# Patient Record
Sex: MALE | Race: BLACK OR AFRICAN AMERICAN | NOT HISPANIC OR LATINO | Employment: FULL TIME | URBAN - METROPOLITAN AREA
[De-identification: names, ages, dates, MRNs, and addresses within clinical notes are randomized per-mention and may not be internally consistent; named-entity substitution may affect disease eponyms.]

---

## 2021-03-26 ENCOUNTER — IMMUNIZATION (OUTPATIENT)
Dept: PRIMARY CARE CLINIC | Facility: CLINIC | Age: 41
End: 2021-03-26
Payer: COMMERCIAL

## 2021-03-26 DIAGNOSIS — Z23 NEED FOR VACCINATION: Primary | ICD-10-CM

## 2021-03-26 PROCEDURE — 0001A PR IMMUNIZ ADMIN, SARS-COV-2 COVID-19 VACC, 30MCG/0.3ML, 1ST DOSE: ICD-10-PCS | Mod: CV19,S$GLB,, | Performed by: INTERNAL MEDICINE

## 2021-03-26 PROCEDURE — 91300 PR SARS-COV- 2 COVID-19 VACCINE, NO PRSV, 30MCG/0.3ML, IM: CPT | Mod: S$GLB,,, | Performed by: INTERNAL MEDICINE

## 2021-03-26 PROCEDURE — 0001A PR IMMUNIZ ADMIN, SARS-COV-2 COVID-19 VACC, 30MCG/0.3ML, 1ST DOSE: CPT | Mod: CV19,S$GLB,, | Performed by: INTERNAL MEDICINE

## 2021-03-26 PROCEDURE — 91300 PR SARS-COV- 2 COVID-19 VACCINE, NO PRSV, 30MCG/0.3ML, IM: ICD-10-PCS | Mod: S$GLB,,, | Performed by: INTERNAL MEDICINE

## 2021-03-26 RX ADMIN — Medication 0.3 ML: at 05:03

## 2021-04-17 ENCOUNTER — IMMUNIZATION (OUTPATIENT)
Dept: PRIMARY CARE CLINIC | Facility: CLINIC | Age: 41
End: 2021-04-17
Payer: COMMERCIAL

## 2021-04-17 DIAGNOSIS — Z23 NEED FOR VACCINATION: Primary | ICD-10-CM

## 2021-04-17 PROCEDURE — 0002A PR IMMUNIZ ADMIN, SARS-COV-2 COVID-19 VACC, 30MCG/0.3ML, 2ND DOSE: CPT | Mod: CV19,S$GLB,, | Performed by: INTERNAL MEDICINE

## 2021-04-17 PROCEDURE — 0002A PR IMMUNIZ ADMIN, SARS-COV-2 COVID-19 VACC, 30MCG/0.3ML, 2ND DOSE: ICD-10-PCS | Mod: CV19,S$GLB,, | Performed by: INTERNAL MEDICINE

## 2021-04-17 PROCEDURE — 91300 PR SARS-COV- 2 COVID-19 VACCINE, NO PRSV, 30MCG/0.3ML, IM: CPT | Mod: S$GLB,,, | Performed by: INTERNAL MEDICINE

## 2021-04-17 PROCEDURE — 91300 PR SARS-COV- 2 COVID-19 VACCINE, NO PRSV, 30MCG/0.3ML, IM: ICD-10-PCS | Mod: S$GLB,,, | Performed by: INTERNAL MEDICINE

## 2021-04-17 RX ADMIN — Medication 0.3 ML: at 05:04

## 2023-06-19 LAB — CRC RECOMMENDATION EXT: NORMAL

## 2024-05-11 ENCOUNTER — HOSPITAL ENCOUNTER (EMERGENCY)
Facility: HOSPITAL | Age: 44
Discharge: HOME | End: 2024-05-12
Attending: STUDENT IN AN ORGANIZED HEALTH CARE EDUCATION/TRAINING PROGRAM

## 2024-05-11 DIAGNOSIS — S09.90XA INJURY OF HEAD, INITIAL ENCOUNTER: Primary | ICD-10-CM

## 2024-05-11 DIAGNOSIS — S06.0X0A CONCUSSION WITHOUT LOSS OF CONSCIOUSNESS, INITIAL ENCOUNTER: ICD-10-CM

## 2024-05-11 PROCEDURE — 99284 EMERGENCY DEPT VISIT MOD MDM: CPT | Mod: 25

## 2024-05-11 PROCEDURE — 25000003 PHARM REV CODE 250: Performed by: STUDENT IN AN ORGANIZED HEALTH CARE EDUCATION/TRAINING PROGRAM

## 2024-05-11 RX ORDER — MECLIZINE HCL 12.5 MG 12.5 MG/1
25 TABLET ORAL
Status: COMPLETED | OUTPATIENT
Start: 2024-05-11 | End: 2024-05-11

## 2024-05-11 RX ORDER — ACETAMINOPHEN 500 MG
1000 TABLET ORAL
Status: COMPLETED | OUTPATIENT
Start: 2024-05-11 | End: 2024-05-11

## 2024-05-11 RX ADMIN — ACETAMINOPHEN 1000 MG: 500 TABLET ORAL at 11:05

## 2024-05-11 RX ADMIN — MECLIZINE 25 MG: 12.5 TABLET ORAL at 11:05

## 2024-05-11 NOTE — Clinical Note
"Barrington Jacome" Wanda was seen and treated in our emergency department on 5/11/2024.  He may return to work on 05/14/2024.       If you have any questions or concerns, please don't hesitate to call.      Olivia Lozano MD"

## 2024-05-12 VITALS
DIASTOLIC BLOOD PRESSURE: 78 MMHG | TEMPERATURE: 98 F | OXYGEN SATURATION: 99 % | HEART RATE: 74 BPM | BODY MASS INDEX: 34.41 KG/M2 | WEIGHT: 268 LBS | SYSTOLIC BLOOD PRESSURE: 137 MMHG | RESPIRATION RATE: 18 BRPM

## 2024-05-12 PROCEDURE — 25000003 PHARM REV CODE 250: Performed by: STUDENT IN AN ORGANIZED HEALTH CARE EDUCATION/TRAINING PROGRAM

## 2024-05-12 RX ORDER — MECLIZINE HYDROCHLORIDE 25 MG/1
25 TABLET ORAL 3 TIMES DAILY PRN
Qty: 21 TABLET | Refills: 0 | Status: SHIPPED | OUTPATIENT
Start: 2024-05-12 | End: 2024-05-28

## 2024-05-12 RX ORDER — IBUPROFEN 400 MG/1
800 TABLET ORAL
Status: COMPLETED | OUTPATIENT
Start: 2024-05-12 | End: 2024-05-12

## 2024-05-12 RX ADMIN — IBUPROFEN 800 MG: 400 TABLET ORAL at 01:05

## 2024-05-12 NOTE — ED NOTES
Barrington Muñoz III, a 44 y.o. male presents to the ED w/ complaint of head injury. Patient states he was on a boat and was hit with a metal wire that is about 1 inch in diameter. Patient states having headache to the left side of his head. Initially patient stated his vision was blurry but has since resolved.     Triage note:  Chief Complaint   Patient presents with    Head Injury     Reports getting with a metal wire on a boat, states he felt like his vision was blurry right after the impact but it has resolved     Review of patient's allergies indicates:  No Known Allergies  Past Medical History:   Diagnosis Date    Hypertension     Hypertension

## 2024-05-12 NOTE — DISCHARGE INSTRUCTIONS
You were seen in the emergency department today after head injury.  Your head scan did not show any evidence of intracranial injury such as bleed or broken bones.  Your symptoms are likely due to a mild concussion.  You may take Tylenol, and Motrin as needed for pain.  You also be prescribed a medication to help with your dizziness.  You will need to follow up outpatient with the primary care doctor within 1 week for re-evaluation.    Please return to the emergency department if you experience any new or concerning symptoms including increasing pain, vision changes, weakness, numbness, difficulty walking, lightheadedness, loss of consciousness, or confusion.

## 2024-05-12 NOTE — ED PROVIDER NOTES
Chief Complaint   Head Injury (Reports getting with a metal wire on a boat, states he felt like his vision was blurry right after the impact but it has resolved)      History Of Present Illness   Barrington Muñoz III is a 44 y.o. male with no pertinent PMHx presenting with head injury.  Patient states that he was working on a boat when a inch thick heavy wire struck him in the head on the left side of his forehead.  States that he fell to his knees but did not hit his head on the ground and did not pass out.  States that since then he has had some dizziness that he describes as a room spinning sensation along with a headache particularly to the left side of his head.  He reports no blurry vision, loss of vision, weakness, or numbness.  He reports no chest pain, shortness of breath.  He reports no nausea, or vomiting.  No medications taken prior to arrival.  Does not take any blood thinners.  Reports no known wounds or lacerations.    Independent Historian: Yes  Other Historian or Collateral: Chart review  Interpretor: No      Review of patient's allergies indicates:  No Known Allergies    No current facility-administered medications on file prior to encounter.     Current Outpatient Medications on File Prior to Encounter   Medication Sig Dispense Refill    lisinopril-hydrochlorothiazide (PRINZIDE,ZESTORETIC) 20-12.5 mg per tablet Take 1 tablet by mouth once daily.         Past History   As per HPI and below:  Past Medical History:   Diagnosis Date    Hypertension     Hypertension      History reviewed. No pertinent surgical history.    Social History     Socioeconomic History    Marital status:    Tobacco Use    Smoking status: Never   Substance and Sexual Activity    Alcohol use: No    Drug use: No    Sexual activity: Yes     Partners: Female       No family history on file.    Physical Exam     Vitals:    05/11/24 2210   BP: (!) 141/95   Pulse: 101   Resp: 18   Temp: 98.8 °F (37.1 °C)   TempSrc: Oral   SpO2:  97%   Weight: 121.6 kg (268 lb)       Physical Exam  Constitutional:       General: He is not in acute distress.     Appearance: He is well-developed. He is not toxic-appearing or diaphoretic.   HENT:      Head: Normocephalic and atraumatic.        Comments: Small scalp hematoma to left upper forehead without overlying abrasions/laceration or palpable skull deformity.     Nose: Nose normal. No congestion.      Mouth/Throat:      Mouth: Mucous membranes are moist.      Pharynx: Oropharynx is clear.   Eyes:      Extraocular Movements: Extraocular movements intact.      Right eye: No nystagmus.      Left eye: No nystagmus.      Pupils: Pupils are equal, round, and reactive to light.   Cardiovascular:      Rate and Rhythm: Normal rate and regular rhythm.   Pulmonary:      Effort: No respiratory distress.      Breath sounds: No wheezing or rhonchi.   Abdominal:      General: There is no distension.   Musculoskeletal:         General: No deformity.      Cervical back: No rigidity.      Comments: No midline C-spine tenderness   Skin:     General: Skin is warm and dry.      Capillary Refill: Capillary refill takes less than 2 seconds.   Neurological:      General: No focal deficit present.      Mental Status: He is alert and oriented to person, place, and time.      Cranial Nerves: No cranial nerve deficit.      Sensory: No sensory deficit.      Motor: No weakness.             Results     Labs Reviewed   HIV 1 / 2 ANTIBODY   HEPATITIS C ANTIBODY       Imaging Results    None               Initial [facility]   Medical Decision Making  Patient is a 44-year-old male presenting after head injury.  Given his continued headache and dizziness, Will get CT to further evaluate for intracranial bleed.  No CT C-spine indicated at this time based on exam.  No other evidence of acute traumatic injury on exam.  Will treat patient's symptoms in the meantime and re-evaluate.    Amount and/or Complexity of Data Reviewed  Radiology:  ordered.    Risk  OTC drugs.  Prescription drug management.               Medications Given / Interventions     Medications   acetaminophen tablet 1,000 mg (1,000 mg Oral Given 5/11/24 2322)   meclizine tablet 25 mg (25 mg Oral Given 5/11/24 2322)       Procedures     ED POCUS Performed: No    Reassessment and ED Course      Head CT independently interpreted without evidence of intracranial bleed or pathology.    Discussed results with the patient.  Discussed need for outpatient follow up.  Discussed continued symptomatic management.  DC to home with return precautions.         Final diagnoses:  [S09.90XA] Injury of head, initial encounter (Primary)           Dispo                        Olivia Lozano MD  05/12/24 8423

## 2024-05-14 ENCOUNTER — HOSPITAL ENCOUNTER (EMERGENCY)
Facility: HOSPITAL | Age: 44
Discharge: HOME | End: 2024-05-14
Attending: STUDENT IN AN ORGANIZED HEALTH CARE EDUCATION/TRAINING PROGRAM
Payer: COMMERCIAL

## 2024-05-14 VITALS
SYSTOLIC BLOOD PRESSURE: 179 MMHG | TEMPERATURE: 99 F | OXYGEN SATURATION: 98 % | RESPIRATION RATE: 17 BRPM | DIASTOLIC BLOOD PRESSURE: 90 MMHG | WEIGHT: 268 LBS | HEART RATE: 70 BPM | BODY MASS INDEX: 34.39 KG/M2 | HEIGHT: 74 IN

## 2024-05-14 DIAGNOSIS — R51.9 ACUTE NONINTRACTABLE HEADACHE, UNSPECIFIED HEADACHE TYPE: Primary | ICD-10-CM

## 2024-05-14 DIAGNOSIS — S09.90XA TRAUMATIC INJURY OF HEAD, INITIAL ENCOUNTER: ICD-10-CM

## 2024-05-14 PROCEDURE — 25000003 PHARM REV CODE 250: Performed by: STUDENT IN AN ORGANIZED HEALTH CARE EDUCATION/TRAINING PROGRAM

## 2024-05-14 PROCEDURE — 99284 EMERGENCY DEPT VISIT MOD MDM: CPT | Mod: 25

## 2024-05-14 RX ORDER — KETOROLAC TROMETHAMINE 10 MG/1
10 TABLET, FILM COATED ORAL
Status: COMPLETED | OUTPATIENT
Start: 2024-05-14 | End: 2024-05-14

## 2024-05-14 RX ORDER — BUTALBITAL, ACETAMINOPHEN AND CAFFEINE 50; 325; 40 MG/1; MG/1; MG/1
1 TABLET ORAL EVERY 6 HOURS PRN
Qty: 30 TABLET | Refills: 0 | Status: SHIPPED | OUTPATIENT
Start: 2024-05-14 | End: 2024-05-28 | Stop reason: ALTCHOICE

## 2024-05-14 RX ORDER — ONDANSETRON 4 MG/1
4 TABLET, ORALLY DISINTEGRATING ORAL EVERY 8 HOURS PRN
Qty: 12 TABLET | Refills: 0 | Status: SHIPPED | OUTPATIENT
Start: 2024-05-14 | End: 2024-05-28 | Stop reason: ALTCHOICE

## 2024-05-14 RX ADMIN — KETOROLAC TROMETHAMINE 10 MG: 10 TABLET, FILM COATED ORAL at 10:05

## 2024-05-14 NOTE — DISCHARGE INSTRUCTIONS
Follow up primary care physician or occupational medicine physician for further management evaluation and return to ED for any worsening symptoms

## 2024-05-14 NOTE — ED PROVIDER NOTES
Encounter Date: 5/14/2024       History     Chief Complaint   Patient presents with    Headache     Ongoing since getting hit in head x 3 days ago. Was seen in ED following and dx with concussion but reports no relief with OTC meds     44-year-old male presents with persistent headache.  Ongoing for the last 3 days.  Occurred after blunt trauma while on the job.  Symptoms not alleviated with Tylenol over-the-counter.  No associated laceration.  No anticoagulation use.    The history is provided by the patient.     Review of patient's allergies indicates:  No Known Allergies  Past Medical History:   Diagnosis Date    Hypertension     Hypertension      History reviewed. No pertinent surgical history.  No family history on file.  Social History     Tobacco Use    Smoking status: Never   Substance Use Topics    Alcohol use: No    Drug use: No     Review of Systems   All other systems reviewed and are negative.      Physical Exam     Initial Vitals [05/14/24 0855]   BP Pulse Resp Temp SpO2   (!) 179/90 70 17 98.6 °F (37 °C) 98 %      MAP       --         Physical Exam    Nursing note and vitals reviewed.  Constitutional: Vital signs are normal.  Non-toxic appearance. No distress.   HENT:   Head: Normocephalic.   Left parietal hematoma, no laceration   Eyes: No scleral icterus.   Pulmonary/Chest: No stridor. No respiratory distress.   Bilateral chest rise   Abdominal: There is no guarding.   Musculoskeletal:         General: No tenderness.      Cervical back: No rigidity.     Neurological: He is alert.   GCS 15, No associated focal motor or sensory deficit   Skin: Skin is warm and dry. No rash noted.   No laceration, left parietal hematoma   Psychiatric: His speech is normal. He is not actively hallucinating.   Not anxious  or agitated         ED Course   Procedures  Labs Reviewed - No data to display       Imaging Results              CT Head Without Contrast (Final result)  Result time 05/14/24 09:56:54      Final  result by Mg Valdes MD (05/14/24 09:56:54)                   Impression:      1.  There is no acute intracranial abnormality.  There is no hemorrhage, mass/mass effect, acute edema or ischemia. There is no skull fracture.      Electronically signed by: Mg Valdes MD  Date:    05/14/2024  Time:    09:56               Narrative:    EXAMINATION:  CT HEAD WITHOUT CONTRAST    CLINICAL HISTORY:  Head trauma, moderate-severe;    TECHNIQUE:  Routine unenhanced axial images were obtained through the head.  Sagittal and coronal reformatted images were created.  The study is reviewed in bone and soft tissue windows.    COMPARISON:  Recent head CT dated 05/12/2024    FINDINGS:  Intracranial contents: There is no acute intracranial abnormality or change in the appearance of the brain compared to the recent prior study.  Brain volume, ventricular size and position are normal.  There is no hemorrhage or mass/mass effect.  There is no acute edema or ischemia.  The gray-white interface is preserved without obvious acute infarction.  There are no definite regions of abnormal attenuation in the brain.  There is no dense vessel.  There is no abnormal extra-axial fluid collection.  The basilar cisterns are open.  The cerebellar tonsils are in normal position at the level of the foramen magnum.    Extracranial contents, calvarium, soft tissues: The calvarium is normal.  There is no acute fracture.  Previously demonstrated small left lateral forehead contusion is less conspicuous on the current study.  The included paranasal sinuses and mastoid air cells are clear.                                       Medications   ketorolac tablet 10 mg (has no administration in time range)     Medical Decision Making  44-year-old male presents with persistent headache and photophobia.  Within differential diagnosis includes occult skull fracture, delayed intracranial hemorrhage from traumatic injury.  Also within differential  diagnosis includes concussion.  No evidence of any globe injury on exam.  We will obtain repeat CT head rule out any traumatic injury.  If negative we will treat with Toradol and discharged with Fioricet ambulatory referral for occupational medicine.  Patient agreeable with plan.  Do not suspect any Tylenol toxicity at this time from history, no GI symptoms at this time or  abdominal pain    -CT head negative, discharged Fioricet and Zofran and referral and counseled on appropriate Tylenol use amount    Amount and/or Complexity of Data Reviewed  External Data Reviewed: radiology and notes.     Details: Patient seen within the last few days, CT head negative initially  Radiology: ordered.    Risk  Prescription drug management.                                      Clinical Impression:  Final diagnoses:  [R51.9] Acute nonintractable headache, unspecified headache type (Primary)  [S09.90XA] Traumatic injury of head, initial encounter          ED Disposition Condition    Discharge Stable          ED Prescriptions       Medication Sig Dispense Start Date End Date Auth. Provider    butalbital-acetaminophen-caffeine -40 mg (FIORICET, ESGIC) -40 mg per tablet Take 1 tablet by mouth every 6 (six) hours as needed for Pain. 30 tablet 5/14/2024 -- Gerardo Jarvis Jr., DO    ondansetron (ZOFRAN-ODT) 4 MG TbDL Take 1 tablet (4 mg total) by mouth every 8 (eight) hours as needed. 12 tablet 5/14/2024 -- Gerardo Jarvis Jr., DO          Follow-up Information    None          Gerardo Jarvis Jr., DO  05/14/24 7208

## 2024-05-14 NOTE — ED NOTES
"Pt identifiers checked and accurate with Barrington Muñoz III    Pt presents to ED with complaints of headache ongoing since head injury 3 days ago. Pt reports left sided headache, described as "throbbing", intermittent dizziness and mild photophobia. Pt denies N/V, vision changes, weakness and numbness and tingling.    "

## 2024-05-20 ENCOUNTER — OFFICE VISIT (OUTPATIENT)
Dept: SPORTS MEDICINE | Facility: CLINIC | Age: 44
End: 2024-05-20
Payer: COMMERCIAL

## 2024-05-20 ENCOUNTER — HOSPITAL ENCOUNTER (OUTPATIENT)
Dept: RADIOLOGY | Facility: HOSPITAL | Age: 44
Discharge: HOME | End: 2024-05-20
Attending: ORTHOPAEDIC SURGERY
Payer: COMMERCIAL

## 2024-05-20 VITALS
DIASTOLIC BLOOD PRESSURE: 86 MMHG | SYSTOLIC BLOOD PRESSURE: 130 MMHG | BODY MASS INDEX: 34.8 KG/M2 | WEIGHT: 271.19 LBS | HEIGHT: 74 IN | HEART RATE: 73 BPM

## 2024-05-20 DIAGNOSIS — M25.512 LEFT SHOULDER PAIN, UNSPECIFIED CHRONICITY: ICD-10-CM

## 2024-05-20 DIAGNOSIS — M25.512 LEFT SHOULDER PAIN, UNSPECIFIED CHRONICITY: Primary | ICD-10-CM

## 2024-05-20 PROCEDURE — 3079F DIAST BP 80-89 MM HG: CPT | Mod: CPTII,S$GLB,, | Performed by: ORTHOPAEDIC SURGERY

## 2024-05-20 PROCEDURE — 73030 X-RAY EXAM OF SHOULDER: CPT | Mod: 26,LT,, | Performed by: INTERNAL MEDICINE

## 2024-05-20 PROCEDURE — 3008F BODY MASS INDEX DOCD: CPT | Mod: CPTII,S$GLB,, | Performed by: ORTHOPAEDIC SURGERY

## 2024-05-20 PROCEDURE — 73030 X-RAY EXAM OF SHOULDER: CPT | Mod: TC,LT

## 2024-05-20 PROCEDURE — 99999 PR PBB SHADOW E&M-EST. PATIENT-LVL III: CPT | Mod: PBBFAC,,, | Performed by: ORTHOPAEDIC SURGERY

## 2024-05-20 PROCEDURE — 1159F MED LIST DOCD IN RCRD: CPT | Mod: CPTII,S$GLB,, | Performed by: ORTHOPAEDIC SURGERY

## 2024-05-20 PROCEDURE — 99204 OFFICE O/P NEW MOD 45 MIN: CPT | Mod: S$GLB,,, | Performed by: ORTHOPAEDIC SURGERY

## 2024-05-20 PROCEDURE — 3075F SYST BP GE 130 - 139MM HG: CPT | Mod: CPTII,S$GLB,, | Performed by: ORTHOPAEDIC SURGERY

## 2024-05-20 NOTE — PROGRESS NOTES
CC: left shoulder pain     44 y.o. Male RHD, [place] , reports that the pain is severe and not responding to any conservative care. He was referred by his mothers pcp, but he does not know their name. He states the pain has been ongoing for approximately 1 year. He denies any specific injury or trauma. He denies any prior injuries in the past. He notices the pain most when he is working out, and doing overhead lifts. He denies any instability, neck pain, or radicular symptoms.        Is affecting ADLs.     SANE: 60/100  VAS: 0/10 today, at worst 7/10    Review of Systems   Constitution: Negative. Negative for chills, fever and night sweats.   HENT: Negative for congestion and headaches.    Eyes: Negative for blurred vision, left vision loss and right vision loss.   Cardiovascular: Negative for chest pain and syncope.   Respiratory: Negative for cough and shortness of breath.    Endocrine: Negative for polydipsia, polyphagia and polyuria.   Hematologic/Lymphatic: Negative for bleeding problem. Does not bruise/bleed easily.   Skin: Negative for dry skin, itching and rash.   Musculoskeletal: Negative for falls and muscle weakness.   Gastrointestinal: Negative for abdominal pain and bowel incontinence.   Genitourinary: Negative for bladder incontinence and nocturia.   Neurological: Negative for disturbances in coordination, loss of balance and seizures.   Psychiatric/Behavioral: Negative for depression. The patient does not have insomnia.    Allergic/Immunologic: Negative for hives and persistent infections.     PAST MEDICAL HISTORY:   Past Medical History:   Diagnosis Date    Hypertension     Hypertension      PAST SURGICAL HISTORY: History reviewed. No pertinent surgical history.  FAMILY HISTORY: No family history on file.  SOCIAL HISTORY:   Social History     Socioeconomic History    Marital status:    Tobacco Use    Smoking status: Never   Substance and Sexual Activity    Alcohol use: No    Drug use: No  "   Sexual activity: Yes     Partners: Female       MEDICATIONS:   Current Outpatient Medications:     lisinopril-hydrochlorothiazide (PRINZIDE,ZESTORETIC) 20-12.5 mg per tablet, Take 1 tablet by mouth once daily., Disp: , Rfl:     butalbital-acetaminophen-caffeine -40 mg (FIORICET, ESGIC) -40 mg per tablet, Take 1 tablet by mouth every 6 (six) hours as needed for Pain. (Patient not taking: Reported on 5/20/2024), Disp: 30 tablet, Rfl: 0    meclizine (ANTIVERT) 25 mg tablet, Take 1 tablet (25 mg total) by mouth 3 (three) times daily as needed for Dizziness., Disp: 21 tablet, Rfl: 0    ondansetron (ZOFRAN-ODT) 4 MG TbDL, Take 1 tablet (4 mg total) by mouth every 8 (eight) hours as needed. (Patient not taking: Reported on 5/20/2024), Disp: 12 tablet, Rfl: 0  ALLERGIES: Review of patient's allergies indicates:  No Known Allergies    VITAL SIGNS: /86   Pulse 73   Ht 6' 2" (1.88 m)   Wt 123 kg (271 lb 2.7 oz)   BMI 34.82 kg/m²      PHYSICAL EXAMINATION:  General:  The patient is alert and oriented x 3.  Mood is pleasant.  Observation of ears, eyes and nose reveal no gross abnormalities.  No labored breathing observed.  Gait is coordinated. Patient can toe walk and heel walk without difficulty.      left SHOULDER / UPPER EXTREMITY EXAM    OBSERVATION:     Swelling  none  Deformity  none   Discoloration  none   Scapular winging none   Scars   none  Atrophy  none    TENDERNESS / CREPITUS (T/C):          T/C      T/C   Clavicle   -/-  SUPRAspinatus    -/-     AC Jt.    -/-  INFRAspinatus  -/-    SC Jt.    -/-  Deltoid    -/-      G. Tuberosity  -/-  LH BICEP groove  +/-   Acromion:  -/-  Midline Neck   -/-     Scapular Spine -/-  Trapezium   -/-   SMA Scapula  -/-  GH jt. line - post  -/-     Scapulothoracic  -/-         ROM: (* = with pain)  Right shoulder   Left shoulder        AROM (PROM)   AROM (PROM)   FE    170° (175°)     170° (175°)     ER at 0°    60°  (65°)    60°  (65°)   ER at 90° ABD  90°  " (90°)    90°  (90°)   IR at 90°  ABD   NA  (40°)     NA  (40°)      IR (spine level)   T10     T10    STRENGTH: (* = with pain) RIGHT SHOULDER  LEFT SHOULDER   SCAPTION at 0  5/5    4/5*   SCAPTION at 30  5/5    5/5*    IR    5/5    5/5   ER    5/5    5/5   BICEPS   5/5    5/5   Deltoid    5/5    5/5     SIGNS:  Painful side       NEER   +    OMALLYS  +    CAREY   +    SPEEDS  neg     DROP ARM   neg   BELLY PRESS neg   Superior escape none    LIFT-OFF  neg   X-Body ADD    neg    MOVING VALGUS neg        STABILITY TESTING    RIGHT SHOULDER   LEFT SHOULDER     Translation     Anterior  up face     up face    Posterior  up face    up face    Sulcus   < 10mm    < 10 mm     Signs   Apprehension   neg      neg       Relocation   no change     no change      Jerk test  neg     neg    EXTREMITY NEURO-VASCULAR EXAM    Sensation grossly intact to light touch all dermatomal regions.    DTR 2+ Biceps, Triceps, BR and Negative Miltons sign   Grossly intact motor function at Elbow, Wrist and Hand   Distal pulses radial and ulnar 2+, brisk cap refill, symmetric.      NECK:  Painless FROM and spinous processes non-tender. Negative Spurlings sign.      OTHER FINDINGS:  + scapular dyskinesia    XRAYS reviewed and interpreted personally by me:  Shoulder trauma series left,  were obtained and reviewed  No convincing fracture or dislocation is noted. The osseous structures appear well mineralized and well aligned    ASSESSMENT:   left:  1. Shoulder pain, impingement   2.  Scapular dyskinesia    PLAN:      -Plan for Left Shoulder MRI    All questions were answered, pt will contact us for questions or concerns in the interim.    I made the decision to obtain old records of the patient including previous notes and imaging. New imaging was ordered today of the extremity or extremities evaluated. I independently reviewed and interpreted the radiographs and/or MRIs today as well as prior imaging.

## 2024-05-25 ENCOUNTER — OFFICE VISIT (OUTPATIENT)
Dept: URGENT CARE | Facility: CLINIC | Age: 44
End: 2024-05-25
Payer: COMMERCIAL

## 2024-05-25 ENCOUNTER — NURSE TRIAGE (OUTPATIENT)
Dept: ADMINISTRATIVE | Facility: CLINIC | Age: 44
End: 2024-05-25
Payer: COMMERCIAL

## 2024-05-25 VITALS
WEIGHT: 271 LBS | DIASTOLIC BLOOD PRESSURE: 103 MMHG | SYSTOLIC BLOOD PRESSURE: 145 MMHG | HEART RATE: 79 BPM | TEMPERATURE: 99 F | HEIGHT: 74 IN | BODY MASS INDEX: 34.78 KG/M2 | RESPIRATION RATE: 16 BRPM | OXYGEN SATURATION: 96 %

## 2024-05-25 DIAGNOSIS — F07.81 POSTCONCUSSIVE SYNDROME: ICD-10-CM

## 2024-05-25 DIAGNOSIS — G44.319 ACUTE POST-TRAUMATIC HEADACHE, NOT INTRACTABLE: Primary | ICD-10-CM

## 2024-05-25 DIAGNOSIS — S09.90XD TRAUMATIC INJURY OF HEAD, SUBSEQUENT ENCOUNTER: ICD-10-CM

## 2024-05-25 PROCEDURE — 99204 OFFICE O/P NEW MOD 45 MIN: CPT | Mod: S$GLB,,,

## 2024-05-25 RX ORDER — SUMATRIPTAN 50 MG/1
50 TABLET, FILM COATED ORAL EVERY 6 HOURS PRN
Qty: 40 TABLET | Refills: 0 | Status: SHIPPED | OUTPATIENT
Start: 2024-05-25 | End: 2024-06-04

## 2024-05-25 RX ORDER — LISINOPRIL 10 MG/1
10 TABLET ORAL
COMMUNITY
End: 2024-05-28

## 2024-05-25 NOTE — PROGRESS NOTES
"Subjective:      Patient ID: Barrington Muñoz III is a 44 y.o. male.    Vitals:  height is 6' 2" (1.88 m) and weight is 122.9 kg (271 lb). His temperature is 98.8 °F (37.1 °C). His blood pressure is 145/103 (abnormal) and his pulse is 79. His respiration is 16 and oxygen saturation is 96%.     Chief Complaint: Headache      In clinic with a chief complaint of left-sided headache beginning left frontal, and radiating to the occiput of the left side.  He is reporting associated photophobia, blurred vision of the left eye, and brain fog.  Patient was seen twice in the emergency room, 5/11, and 5/14.  He had  CT of the head at both times that were unremarkable. He reports he has had constant headaches since being seen.  He has had adequate relief using Fioricet.  He  has not taken Fioricet in 3 days. He reports while at work today he felt mildly confused.  [place] have to relay his traffic information, and traffic control reach out to him asking why, and he was unsure why he did not relay his traffic..  He also reports he forgot his wife's phone number, and had to look it up.  On physical exam, he stalled while stating his own name. He otherwise had a normal physical exam.  No focal neuro deficits.  GCS 15.  NIH 0    Headache   This is a recurrent problem. The current episode started 1 to 4 weeks ago. The problem occurs daily. The problem has been waxing and waning. The pain is located in the Left unilateral region. The pain quality is similar to prior headaches. The quality of the pain is described as aching and throbbing. Associated symptoms include blurred vision and photophobia. Pertinent negatives include no dizziness, eye pain, nausea, neck pain, numbness, seizures or vomiting. The symptoms are aggravated by bright light. Treatments tried: Fioricet. His past medical history is significant for hypertension and recent head traumas. There is no history of migraine headaches. "     Constitution: Negative.   HENT: Negative.     Neck: Negative for neck pain and neck stiffness.   Cardiovascular: Negative.    Eyes:  Positive for photophobia and blurred vision. Negative for eye discharge, eye itching and eye pain.   Respiratory: Negative.     Gastrointestinal:  Negative for nausea, vomiting and bowel incontinence.   Endocrine: negative.   Genitourinary: Negative.    Musculoskeletal: Negative.    Skin: Negative.    Allergic/Immunologic: Positive for immunizations up-to-date.   Neurological:  Positive for speech difficulty, headaches and disorientation. Negative for dizziness, history of migraines, altered mental status, loss of consciousness, numbness, tingling, seizures and tremors.   Hematologic/Lymphatic: Negative for easy bruising/bleeding and history of bleeding disorder. Does not bruise/bleed easily.   Psychiatric/Behavioral:  Positive for disorientation. Negative for altered mental status.       Objective:     Physical Exam   Constitutional: He is oriented to person, place, and time. He appears well-developed. He is cooperative.   HENT:   Head: Normocephalic and atraumatic.       Ears:   Right Ear: Hearing and external ear normal.   Left Ear: Hearing and external ear normal.   Nose: Nose normal. No mucosal edema or nasal deformity. No epistaxis. Right sinus exhibits no maxillary sinus tenderness and no frontal sinus tenderness. Left sinus exhibits no maxillary sinus tenderness and no frontal sinus tenderness.   Mouth/Throat: Uvula is midline, oropharynx is clear and moist and mucous membranes are normal. Mucous membranes are moist. No trismus in the jaw. Normal dentition. No uvula swelling. Oropharynx is clear.   Eyes: Right eye visual fields normal and left eye visual fields normal. Conjunctivae and lids are normal. Pupils are equal, round, and reactive to light. No visual field deficit is present. Extraocular movement intact vision grossly intact   Neck: Trachea normal and phonation  normal. Neck supple.   Cardiovascular: Normal rate, regular rhythm, normal heart sounds and normal pulses.   Pulmonary/Chest: Effort normal and breath sounds normal.   Abdominal: Normal appearance.   Musculoskeletal: Normal range of motion.         General: Normal range of motion.   Neurological: no focal deficit. He is alert and oriented to person, place, and time. He has normal motor skills, normal sensation and intact cranial nerves (2-12). He displays no weakness, no tremor, facial symmetry and no dysarthria. No cranial nerve deficit or sensory deficit. He exhibits normal muscle tone. He has a normal Finger-Nose-Finger Test. Coordination: Heel to shin test normal. Gait and coordination normal. GCS eye subscore is 4. GCS verbal subscore is 5. GCS motor subscore is 6.   Skin: Skin is warm, dry and intact. Capillary refill takes 2 to 3 seconds.   Psychiatric: His speech is normal and behavior is normal. Mood, judgment and thought content normal.   Nursing note and vitals reviewed.      Assessment:     1. Acute post-traumatic headache, not intractable    2. Traumatic injury of head, subsequent encounter    3. Postconcussive syndrome        Plan:       Acute post-traumatic headache, not intractable  -     sumatriptan (IMITREX) 50 MG tablet; Take 1 tablet (50 mg total) by mouth every 6 (six) hours as needed for Migraine.  Dispense: 40 tablet; Refill: 0  -     Ambulatory referral/consult to Tele-Headache    Traumatic injury of head, subsequent encounter  -     sumatriptan (IMITREX) 50 MG tablet; Take 1 tablet (50 mg total) by mouth every 6 (six) hours as needed for Migraine.  Dispense: 40 tablet; Refill: 0  -     Ambulatory referral/consult to Tele-Headache    Postconcussive syndrome  -     Ambulatory referral/consult to Tele-Headache       Recent head trauma.  Continues to have recurrent headaches, photophobia, and blurred vision.  Also has difficulty word finding.  No nausea, or vomiting.  Denies  midline cervical  tenderness.  Symptoms improve with Fioricet.  Likely having rebound headaches from Fioricet.  However, can not exclude postconcussive syndrome. He has had 2- CTs of the head 72 hours apart. I do not suspect a delayed intracranial hemorrhage or hematoma. None of his symptoms have worsened.  Patient has also not performed any the step approach to return to play algorithm for concussion protocol.  He has been instructed to stop taking  Fioricet and begin Imitrex as needed.  Neurology/  Headache clinic referral has been placed. Patient has not been established with occupational health. ER precautions discussed with the patient, as well as return instructions.       Additional MDM:     NIH Stroke Scale:   Interval = baseline (upon arrival/admit)  Level of consciousness = 0 - alert  LOC questions = 0 - answers both correctly  LOC commands = 0 - performs both correctly  Best gaze = 0 - normal  Facial palsy = 0 - normal  Motor left arm =  0 - no drift  Motor right arm =  0 - no drift  Motor left leg = 0 - no drift  Motor right leg =  0 - no drift  Limb ataxia = 0 - absent  Sensory = 0 - normal  Best language = 0 - no aphasia  Dysarthria = 0 - normal articulation  Extinction and inattention = 0 - no neglect

## 2024-05-25 NOTE — TELEPHONE ENCOUNTER
Patent is c/o a headache and relating it to a head injury while on the job.  He states the headache comes and goes and varies in strength.  Patient is advised as per protocol.    Reason for Disposition   Concussion symptoms are worsening    Additional Information   Negative: Difficult to awaken or acting confused (e.g., disoriented, slurred speech)   Negative: [1] Weakness of the face, arm or leg on one side of the body AND [2] new-onset   Negative: [1] Numbness of the face, arm or leg on one side of the body AND [2] new-onset   Negative: [1] Loss of speech or garbled speech AND [2] new-onset   Negative: Passed out (i.e., lost consciousness, collapsed and was not responding)   Negative: Sounds like a life-threatening emergency to the triager   Followed a head injury   Negative: [1] ACUTE NEURO SYMPTOM AND [2] present now  (DEFINITION: difficult to awaken OR confused thinking and talking OR slurred speech OR weakness of arms OR unsteady walking)   Negative: Knocked out (unconscious) > 1 minute   Negative: Seizure (convulsion) occurred  (Exception: Prior history of seizures and now alert and without Acute Neuro Symptoms.)   Negative: Penetrating head injury (e.g., knife, gun shot wound, metal object)   Negative: [1] Major bleeding (e.g., actively dripping or spurting) AND [2] can't be stopped   Negative: [1] Dangerous mechanism of injury (e.g., MVA, diving, trampoline, contact sports, fall > 10 feet or 3 meters) AND [2] NECK pain AND [3] began < 1 hour after injury   Negative: Sounds like a life-threatening emergency to the triager   [1] Diagnosed with concussion AND [2] within last 14 days   Negative: Weakness (i.e., paralysis, loss of muscle strength) of the face, arm or leg on one side of the body   Negative: Loss of speech or garbled speech   Negative: Difficult to awaken or acting confused (e.g., disoriented, slurred speech)   Negative: Sounds like a life-threatening emergency to the triager   Negative: [1]  Concussion suspected AND [2] has not been examined by a doctor (or NP/PA)   Negative: [1] Mild traumatic brain injury (mTBI; concussion) AND [2] more than 14 days since head injury   Negative: [1] Black eyes on both sides AND [2] onset within 24 hours of head injury    Protocols used: Headache-A-AH, Head Injury-A-AH, Concussion (mTBI) Less Than 14 Days Ago Follow-up Call-A-AH

## 2024-05-25 NOTE — LETTER
May 25, 2024      [facility]  [address] [address]       Patient: Barrington Muñoz   YOB: 1980  Date of Visit: 05/25/2024    To Whom It May Concern:    Stacie Muñoz  was at [facility] on 05/25/2024. The patient may return to work/school on 5/27/24 with restrictions. Limited physical activity.  If you have any questions or concerns, or if I can be of further assistance, please do not hesitate to contact me.    Sincerely,    YANDEL Gonzalez

## 2024-05-27 NOTE — PROGRESS NOTES
[facility]  Office Visit Note     SUBJECTIVE:   HPI: Barrington Muñoz III  is a 44 y.o. male here to establish care     Of note, patient went to urgent care on May 25th, 2024 for headache  It is noted that he has been to emergency room prior to that twice and had CT head which was unremarkable both times     He was sent home with sumatriptan   It appears that he also had very elevated blood pressure at 145/103    On May 20, 2024   Patient followed up with sports Medicine for left shoulder pain    Patient is here to establish care   Denies any significant medical conditions other than blood pressure issues  Has been compliant with lisinopril 10 mg daily and his blood pressure is 118/92    For headache, he denies having any additional headache and states that he has been feeling well       Declines STD panel done today   Colonoscopy 1 year ago and was informed that he had 1 polyp and was recommended to follow up in 10 years for surveillance  Works as a     No visits with results within 6 Month(s) from this visit.   Latest known visit with results is:   Lab Visit on 04/14/2016   Component Date Value Ref Range Status    Cholesterol 04/14/2016 227 (H)  120 - 199 mg/dL Final    Triglycerides 04/14/2016 85  30 - 150 mg/dL Final    HDL 04/14/2016 55  40 - 75 mg/dL Final    LDL Cholesterol 04/14/2016 155.0  63.0 - 159.0 mg/dL Final    HDL/Cholesterol Ratio 04/14/2016 24.2  20.0 - 50.0 % Final    Total Cholesterol/HDL Ratio 04/14/2016 4.1  2.0 - 5.0 Final    Non-HDL Cholesterol 04/14/2016 172  mg/dL Final    Total Protein 04/14/2016 7.3  6.0 - 8.4 g/dL Final    Albumin 04/14/2016 4.1  3.5 - 5.2 g/dL Final    Total Bilirubin 04/14/2016 0.2  0.1 - 1.0 mg/dL Final    Bilirubin, Direct 04/14/2016 0.1  0.1 - 0.3 mg/dL Final    AST 04/14/2016 21  10 - 40 U/L Final    ALT 04/14/2016 21  10 - 44 U/L Final    Alkaline Phosphatase 04/14/2016 72  55 - 135 U/L Final    Sodium 04/14/2016 142  136  - 145 mmol/L Final    Potassium 04/14/2016 4.2  3.5 - 5.1 mmol/L Final    Chloride 04/14/2016 107  95 - 110 mmol/L Final    CO2 04/14/2016 28  23 - 29 mmol/L Final    Glucose 04/14/2016 105  70 - 110 mg/dL Final    BUN 04/14/2016 10  6 - 20 mg/dL Final    Creatinine 04/14/2016 1.2  0.5 - 1.4 mg/dL Final    Calcium 04/14/2016 9.7  8.7 - 10.5 mg/dL Final    Anion Gap 04/14/2016 7 (L)  8 - 16 mmol/L Final    eGFR if African American 04/14/2016 >60  >60 mL/min/1.73 m^2 Final    eGFR if non African American 04/14/2016 >60  >60 mL/min/1.73 m^2 Final    WBC 04/14/2016 5.20  3.90 - 12.70 K/uL Final    RBC 04/14/2016 5.31  4.60 - 6.20 M/uL Final    Hemoglobin 04/14/2016 14.1  14.0 - 18.0 g/dL Final    Hematocrit 04/14/2016 43.6  40.0 - 54.0 % Final    MCV 04/14/2016 82  82 - 98 fL Final    MCH 04/14/2016 26.6 (L)  27.0 - 31.0 pg Final    MCHC 04/14/2016 32.3  32.0 - 36.0 % Final    RDW 04/14/2016 13.8  11.5 - 14.5 % Final    Platelets 04/14/2016 293  150 - 350 K/uL Final    MPV 04/14/2016 10.3  9.2 - 12.9 fL Final    Gran # (ANC) 04/14/2016 2.7  1.8 - 7.7 K/uL Final    Lymph # 04/14/2016 1.8  1.0 - 4.8 K/uL Final    Mono # 04/14/2016 0.5  0.3 - 1.0 K/uL Final    Eos # 04/14/2016 0.2  0.0 - 0.5 K/uL Final    Baso # 04/14/2016 0.03  0.00 - 0.20 K/uL Final    Gran % 04/14/2016 51.9  38.0 - 73.0 % Final    Lymph % 04/14/2016 35.2  18.0 - 48.0 % Final    Mono % 04/14/2016 9.2  4.0 - 15.0 % Final    Eosinophil % 04/14/2016 3.1  0.0 - 8.0 % Final    Basophil % 04/14/2016 0.6  0.0 - 1.9 % Final    Differential Method 04/14/2016 Automated   Final    TSH 04/14/2016 2.062  0.400 - 4.000 uIU/mL Final         Current Outpatient Medications on File Prior to Visit   Medication Sig Dispense Refill    sumatriptan (IMITREX) 50 MG tablet Take 1 tablet (50 mg total) by mouth every 6 (six) hours as needed for Migraine. 40 tablet 0    [DISCONTINUED] lisinopriL 10 MG tablet Take 10 mg by mouth.      [DISCONTINUED] butalbital-acetaminophen-caffeine  -40 mg (FIORICET, ESGIC) -40 mg per tablet Take 1 tablet by mouth every 6 (six) hours as needed for Pain. (Patient not taking: Reported on 5/28/2024) 30 tablet 0    [DISCONTINUED] lisinopril-hydrochlorothiazide (PRINZIDE,ZESTORETIC) 20-12.5 mg per tablet Take 1 tablet by mouth once daily. (Patient not taking: Reported on 5/25/2024)      [DISCONTINUED] meclizine (ANTIVERT) 25 mg tablet Take 1 tablet (25 mg total) by mouth 3 (three) times daily as needed for Dizziness. (Patient not taking: Reported on 5/28/2024) 21 tablet 0    [DISCONTINUED] ondansetron (ZOFRAN-ODT) 4 MG TbDL Take 1 tablet (4 mg total) by mouth every 8 (eight) hours as needed. (Patient not taking: Reported on 5/20/2024) 12 tablet 0     No current facility-administered medications on file prior to visit.     Past Medical History:   Diagnosis Date    Hypertension     Hypertension      History reviewed. No pertinent surgical history.  History reviewed. No pertinent surgical history.  Family History   Problem Relation Name Age of Onset    Hypertension Mother Deng Muñoz     Hypertension Father Barrington Muñoz        Marital Status:   Alcohol History:  reports no history of alcohol use.  Tobacco History:  reports that he has never smoked. He has never been exposed to tobacco smoke. He has never used smokeless tobacco.  Drug History:  reports no history of drug use.    Health Maintenance Topics with due status: Not Due       Topic Last Completion Date    TETANUS VACCINE 03/08/2023    Lipid Panel 03/08/2023    Influenza Vaccine Not Due     Immunization History   Administered Date(s) Administered    COVID-19, MRNA, LN-S, PF (Pfizer) (Purple Cap) 03/26/2021, 04/17/2021       Review of patient's allergies indicates:  No Known Allergies    Current Outpatient Medications:     sumatriptan (IMITREX) 50 MG tablet, Take 1 tablet (50 mg total) by mouth every 6 (six) hours as needed for Migraine., Disp: 40 tablet, Rfl: 0    lisinopriL  "(PRINIVIL,ZESTRIL) 20 MG tablet, Take 1 tablet (20 mg total) by mouth once daily., Disp: 90 tablet, Rfl: 3    Review of Systems   Constitutional:  Negative for chills and fever.   Eyes:  Negative for visual disturbance.   Respiratory:  Negative for shortness of breath.    Cardiovascular:  Negative for chest pain.   Gastrointestinal:  Negative for blood in stool, constipation, diarrhea, nausea and vomiting.   Genitourinary:  Negative for hematuria.   Neurological:  Negative for headaches.       OBJECTIVE:      Vitals:    05/28/24 1057   BP: (!) 118/92   BP Location: Right arm   Patient Position: Sitting   BP Method: Large (Manual)   Pulse: 79   Resp: 16   SpO2: 97%   Weight: 121.2 kg (267 lb 3.2 oz)   Height: 6' 2" (1.88 m)     Physical Exam  Constitutional:       General: He is not in acute distress.     Appearance: He is obese. He is not ill-appearing or toxic-appearing.   HENT:      Head: Normocephalic and atraumatic.      Mouth/Throat:      Mouth: Mucous membranes are moist.      Pharynx: Uvula midline. No pharyngeal swelling.   Eyes:      Extraocular Movements: Extraocular movements intact.      Pupils: Pupils are equal, round, and reactive to light.   Cardiovascular:      Rate and Rhythm: Normal rate and regular rhythm.   Pulmonary:      Effort: Pulmonary effort is normal. No tachypnea, bradypnea, accessory muscle usage, prolonged expiration or respiratory distress.      Breath sounds: Normal breath sounds. No stridor. No wheezing, rhonchi or rales.   Abdominal:      General: Bowel sounds are normal.      Palpations: Abdomen is soft.      Tenderness: There is no abdominal tenderness. There is no guarding or rebound.   Neurological:      General: No focal deficit present.      Mental Status: He is alert.   Psychiatric:         Mood and Affect: Mood normal.         Behavior: Behavior normal.        Assessment:       1. Routine general medical examination at a health care facility    2. Essential hypertension  "   3. BMI 34.0-34.9,adult        Plan:       Routine general medical examination at a health care facility  -     CBC Auto Differential; Future; Expected date: 05/28/2024  -     Hemoglobin A1C; Future; Expected date: 05/28/2024  -     Comprehensive Metabolic Panel; Future; Expected date: 05/28/2024  -     Lipid Panel; Future; Expected date: 05/28/2024  -     TSH; Future; Expected date: 05/28/2024  -     T4, Free; Future; Expected date: 05/28/2024  Baseline lab work today  Will obtain colonoscopy results   Eye exam not up to date -- encouraged him to get annual eye exam  Dental exam up to date per patient     Essential hypertension  -     lisinopriL (PRINIVIL,ZESTRIL) 20 MG tablet; Take 1 tablet (20 mg total) by mouth once daily.  Dispense: 90 tablet; Refill: 3  BP not controlled as DBP > 90 mmHg  Will further increase lisinopril dose from 10 mg to 20 mg daily   Will follow up in a few months     BMI 34.0-34.9,adult      Counseled on age and gender appropriate medical preventative services, including cancer screenings, immunizations, overall nutritional health, need for a consistent exercise regimen and an overall push towards maintaining a vigorous and active lifestyle.          Khushi Oglesby M.D.  5/28/2024    This note was created using [employer] voice recognition software that occasionally misinterprets phrases or words

## 2024-05-28 ENCOUNTER — OFFICE VISIT (OUTPATIENT)
Dept: FAMILY MEDICINE | Facility: CLINIC | Age: 44
End: 2024-05-28
Payer: COMMERCIAL

## 2024-05-28 ENCOUNTER — LAB VISIT (OUTPATIENT)
Dept: LAB | Facility: HOSPITAL | Age: 44
End: 2024-05-28
Attending: STUDENT IN AN ORGANIZED HEALTH CARE EDUCATION/TRAINING PROGRAM
Payer: COMMERCIAL

## 2024-05-28 VITALS
OXYGEN SATURATION: 97 % | BODY MASS INDEX: 34.29 KG/M2 | HEART RATE: 79 BPM | SYSTOLIC BLOOD PRESSURE: 118 MMHG | WEIGHT: 267.19 LBS | DIASTOLIC BLOOD PRESSURE: 92 MMHG | HEIGHT: 74 IN | RESPIRATION RATE: 16 BRPM

## 2024-05-28 DIAGNOSIS — Z00.00 ROUTINE GENERAL MEDICAL EXAMINATION AT A HEALTH CARE FACILITY: ICD-10-CM

## 2024-05-28 DIAGNOSIS — Z00.00 ROUTINE GENERAL MEDICAL EXAMINATION AT A HEALTH CARE FACILITY: Primary | ICD-10-CM

## 2024-05-28 DIAGNOSIS — I10 ESSENTIAL HYPERTENSION: ICD-10-CM

## 2024-05-28 LAB
ALBUMIN SERPL BCP-MCNC: 4.4 G/DL (ref 3.5–5.2)
ALP SERPL-CCNC: 65 U/L (ref 55–135)
ALT SERPL W/O P-5'-P-CCNC: 16 U/L (ref 10–44)
ANION GAP SERPL CALC-SCNC: 7 MMOL/L (ref 8–16)
AST SERPL-CCNC: 14 U/L (ref 10–40)
BASOPHILS # BLD AUTO: 0.02 K/UL (ref 0–0.2)
BASOPHILS NFR BLD: 0.5 % (ref 0–1.9)
BILIRUB SERPL-MCNC: 0.5 MG/DL (ref 0.1–1)
BUN SERPL-MCNC: 14 MG/DL (ref 6–20)
CALCIUM SERPL-MCNC: 10.3 MG/DL (ref 8.7–10.5)
CHLORIDE SERPL-SCNC: 109 MMOL/L (ref 95–110)
CHOLEST SERPL-MCNC: 237 MG/DL (ref 120–199)
CHOLEST/HDLC SERPL: 3.5 {RATIO} (ref 2–5)
CO2 SERPL-SCNC: 23 MMOL/L (ref 23–29)
CREAT SERPL-MCNC: 1.2 MG/DL (ref 0.5–1.4)
DIFFERENTIAL METHOD BLD: ABNORMAL
EOSINOPHIL # BLD AUTO: 0.1 K/UL (ref 0–0.5)
EOSINOPHIL NFR BLD: 1.6 % (ref 0–8)
ERYTHROCYTE [DISTWIDTH] IN BLOOD BY AUTOMATED COUNT: 14.9 % (ref 11.5–14.5)
EST. GFR  (NO RACE VARIABLE): >60 ML/MIN/1.73 M^2
ESTIMATED AVG GLUCOSE: 131 MG/DL (ref 68–131)
GLUCOSE SERPL-MCNC: 105 MG/DL (ref 70–110)
HBA1C MFR BLD: 6.2 % (ref 4–5.6)
HCT VFR BLD AUTO: 45.7 % (ref 40–54)
HDLC SERPL-MCNC: 67 MG/DL (ref 40–75)
HDLC SERPL: 28.3 % (ref 20–50)
HGB BLD-MCNC: 14.5 G/DL (ref 14–18)
IMM GRANULOCYTES # BLD AUTO: 0.01 K/UL (ref 0–0.04)
IMM GRANULOCYTES NFR BLD AUTO: 0.2 % (ref 0–0.5)
LDLC SERPL CALC-MCNC: 155.6 MG/DL (ref 63–159)
LYMPHOCYTES # BLD AUTO: 1.6 K/UL (ref 1–4.8)
LYMPHOCYTES NFR BLD: 37.3 % (ref 18–48)
MCH RBC QN AUTO: 26.4 PG (ref 27–31)
MCHC RBC AUTO-ENTMCNC: 31.7 G/DL (ref 32–36)
MCV RBC AUTO: 83 FL (ref 82–98)
MONOCYTES # BLD AUTO: 0.4 K/UL (ref 0.3–1)
MONOCYTES NFR BLD: 8.6 % (ref 4–15)
NEUTROPHILS # BLD AUTO: 2.3 K/UL (ref 1.8–7.7)
NEUTROPHILS NFR BLD: 51.8 % (ref 38–73)
NONHDLC SERPL-MCNC: 170 MG/DL
NRBC BLD-RTO: 0 /100 WBC
PLATELET # BLD AUTO: 252 K/UL (ref 150–450)
PMV BLD AUTO: 11.3 FL (ref 9.2–12.9)
POTASSIUM SERPL-SCNC: 4.4 MMOL/L (ref 3.5–5.1)
PROT SERPL-MCNC: 7.5 G/DL (ref 6–8.4)
RBC # BLD AUTO: 5.5 M/UL (ref 4.6–6.2)
SODIUM SERPL-SCNC: 139 MMOL/L (ref 136–145)
T4 FREE SERPL-MCNC: 0.77 NG/DL (ref 0.71–1.51)
TRIGL SERPL-MCNC: 72 MG/DL (ref 30–150)
TSH SERPL DL<=0.005 MIU/L-ACNC: 2.36 UIU/ML (ref 0.4–4)
WBC # BLD AUTO: 4.4 K/UL (ref 3.9–12.7)

## 2024-05-28 PROCEDURE — 1160F RVW MEDS BY RX/DR IN RCRD: CPT | Mod: CPTII,S$GLB,, | Performed by: STUDENT IN AN ORGANIZED HEALTH CARE EDUCATION/TRAINING PROGRAM

## 2024-05-28 PROCEDURE — 99396 PREV VISIT EST AGE 40-64: CPT | Mod: S$GLB,,, | Performed by: STUDENT IN AN ORGANIZED HEALTH CARE EDUCATION/TRAINING PROGRAM

## 2024-05-28 PROCEDURE — 84443 ASSAY THYROID STIM HORMONE: CPT | Performed by: STUDENT IN AN ORGANIZED HEALTH CARE EDUCATION/TRAINING PROGRAM

## 2024-05-28 PROCEDURE — 3074F SYST BP LT 130 MM HG: CPT | Mod: CPTII,S$GLB,, | Performed by: STUDENT IN AN ORGANIZED HEALTH CARE EDUCATION/TRAINING PROGRAM

## 2024-05-28 PROCEDURE — 36415 COLL VENOUS BLD VENIPUNCTURE: CPT | Mod: PO | Performed by: STUDENT IN AN ORGANIZED HEALTH CARE EDUCATION/TRAINING PROGRAM

## 2024-05-28 PROCEDURE — 85025 COMPLETE CBC W/AUTO DIFF WBC: CPT | Performed by: STUDENT IN AN ORGANIZED HEALTH CARE EDUCATION/TRAINING PROGRAM

## 2024-05-28 PROCEDURE — 99999 PR PBB SHADOW E&M-EST. PATIENT-LVL IV: CPT | Mod: PBBFAC,,, | Performed by: STUDENT IN AN ORGANIZED HEALTH CARE EDUCATION/TRAINING PROGRAM

## 2024-05-28 PROCEDURE — 80061 LIPID PANEL: CPT | Performed by: STUDENT IN AN ORGANIZED HEALTH CARE EDUCATION/TRAINING PROGRAM

## 2024-05-28 PROCEDURE — 1159F MED LIST DOCD IN RCRD: CPT | Mod: CPTII,S$GLB,, | Performed by: STUDENT IN AN ORGANIZED HEALTH CARE EDUCATION/TRAINING PROGRAM

## 2024-05-28 PROCEDURE — 80053 COMPREHEN METABOLIC PANEL: CPT | Performed by: STUDENT IN AN ORGANIZED HEALTH CARE EDUCATION/TRAINING PROGRAM

## 2024-05-28 PROCEDURE — 4010F ACE/ARB THERAPY RXD/TAKEN: CPT | Mod: CPTII,S$GLB,, | Performed by: STUDENT IN AN ORGANIZED HEALTH CARE EDUCATION/TRAINING PROGRAM

## 2024-05-28 PROCEDURE — 83036 HEMOGLOBIN GLYCOSYLATED A1C: CPT | Performed by: STUDENT IN AN ORGANIZED HEALTH CARE EDUCATION/TRAINING PROGRAM

## 2024-05-28 PROCEDURE — 3080F DIAST BP >= 90 MM HG: CPT | Mod: CPTII,S$GLB,, | Performed by: STUDENT IN AN ORGANIZED HEALTH CARE EDUCATION/TRAINING PROGRAM

## 2024-05-28 PROCEDURE — 84439 ASSAY OF FREE THYROXINE: CPT | Performed by: STUDENT IN AN ORGANIZED HEALTH CARE EDUCATION/TRAINING PROGRAM

## 2024-05-28 PROCEDURE — 3008F BODY MASS INDEX DOCD: CPT | Mod: CPTII,S$GLB,, | Performed by: STUDENT IN AN ORGANIZED HEALTH CARE EDUCATION/TRAINING PROGRAM

## 2024-05-28 RX ORDER — LISINOPRIL 20 MG/1
20 TABLET ORAL DAILY
Qty: 90 TABLET | Refills: 3 | Status: SHIPPED | OUTPATIENT
Start: 2024-05-28 | End: 2025-05-28

## 2024-05-28 NOTE — PATIENT INSTRUCTIONS
Ziggy Ferris,     If you are due for any health screening(s) below please notify me so we can arrange them to be ordered and scheduled. Most healthy patients at your age complete them, but you are free to accept or refuse.     If you can't do it, I'll definitely understand. If you can, I'd certainly appreciate it!    Tests to Keep You Healthy    Colon Cancer Screening: DUE      Its time for your colon cancer screening     Colorectal cancer is one of the leading causes of cancer death for men and women but it doesnt have to be. Screenings can prevent colorectal cancer or find it early enough to treat and cure the disease.     Our records indicate that you may be overdue for colon cancer screening. A colonoscopy or stool screening test can help identify patients at risk for developing colon cancer. Cancer screenings save lives, so schedule yours today to stay healthy.     A colonoscopy is the preferred test for detecting colon cancer. It is needed only once every 10 years if results are negative. While you are sedated, a flexible, lighted tube with a tiny camera is inserted into the rectum and advanced through the colon to look for cancers.     An alternative screening test that is used at home and returned to the lab may also be used. It detects hidden blood in bowel movements which could indicate cancer in the colon. If results are positive, you will need a colonoscopy to determine if the blood is a sign of cancer. This type of follow up (diagnostic) colonoscopy usually requires additional copays as required by your insurance provider.     If you recently had your colon cancer screening performed outside of [facility], please let your Health care team know so that they can update your health record. Please contact your PCP if you have any questions.    Lets manage your high blood pressure     Your blood pressure was above 140/90 today during your visit. We recommend that you schedule a nurse visit in two  weeks to check your blood pressure and discuss ways to support your health goals.     You can also manage your health and record your blood pressure from the comfort of home by keeping a daily blood pressure log. These results are shared with and reviewed by your provider. Please print this form (Daily Blood Pressure Log) to assist you in keeping track of your blood pressure at home.     Schedule your nurse visit in two weeks to learn more about how to track and manage high blood pressure.    Daily Blood Pressure Log    Name:__________________________________                  Date of Birth:_________    Average Blood Pressure:  __________      Date: Time  (a.m.) Blood  Pressure: Pulse  Rate: Time  (p.m.) Blood  Pressure : Pulse  Rate:   Sample 8:37 127/83 84

## 2024-05-28 NOTE — Clinical Note
Please obtain records for his colonoscopy from last year He got it from [address] in [place]   Thank you, Khushi Oglesby MD

## 2024-05-29 ENCOUNTER — PATIENT OUTREACH (OUTPATIENT)
Dept: ADMINISTRATIVE | Facility: HOSPITAL | Age: 44
End: 2024-05-29
Payer: COMMERCIAL

## 2024-05-29 NOTE — LETTER
AUTHORIZATION FOR RELEASE OF   CONFIDENTIAL INFORMATION    [facility] [place]    We are seeing Barrington Muñoz III, date of birth 1980, in the clinic at [facility]. Khuhsi Oglesby MD is the patient's PCP. Barrington Muñoz III has an outstanding lab/procedure at the time we reviewed his chart. In order to help keep his health information updated, he has authorized us to request the following medical record(s):        COLONOSCOPY            Please fax records to [facility], [place], [place], [place], [phone]     If you have any questions, please contact Esther Hernandez, Care Coordinator   at [phone].              Patient Name: Barrington Muñoz III  : 1980  Patient Phone #: [phone]

## 2024-05-29 NOTE — PROGRESS NOTES
Population Health Chart Review & Patient Outreach Details      Additional [facility] Health Notes:               Updates Requested / Reviewed:      Updated Care Coordination Note, Care Everywhere, , and Care Team Updated         Health Maintenance Topics Overdue:      VBHM Score: 2     Colon Cancer Screening  Uncontrolled BP                       Health Maintenance Topic(s) Outreach Outcomes & Actions Taken:    Colorectal Cancer Screening - Outreach Outcomes & Actions Taken  : External Records Requested & Care Team Updated if Applicable

## 2024-05-30 ENCOUNTER — PATIENT OUTREACH (OUTPATIENT)
Dept: ADMINISTRATIVE | Facility: HOSPITAL | Age: 44
End: 2024-05-30
Payer: COMMERCIAL

## 2024-05-30 NOTE — PROGRESS NOTES
Population Health Chart Review & Patient Outreach Details      Additional Pop Health Notes:               Updates Requested / Reviewed:      Updated Care Coordination Note, , and Care Team Updated         Health Maintenance Topics Overdue:      VBHM Score: 2     Colon Cancer Screening  Uncontrolled BP                       Health Maintenance Topic(s) Outreach Outcomes & Actions Taken:    Colorectal Cancer Screening - Outreach Outcomes & Actions Taken  : External Records Uploaded, Care Team Updated, & History Updated if Applicable

## 2025-08-20 ENCOUNTER — PATIENT MESSAGE (OUTPATIENT)
Dept: ADMINISTRATIVE | Facility: HOSPITAL | Age: 45
End: 2025-08-20
Payer: COMMERCIAL